# Patient Record
Sex: FEMALE | Race: WHITE | NOT HISPANIC OR LATINO | URBAN - METROPOLITAN AREA
[De-identification: names, ages, dates, MRNs, and addresses within clinical notes are randomized per-mention and may not be internally consistent; named-entity substitution may affect disease eponyms.]

---

## 2023-06-18 ENCOUNTER — OFFICE VISIT (OUTPATIENT)
Dept: URGENT CARE | Facility: CLINIC | Age: 68
End: 2023-06-18
Payer: MEDICARE

## 2023-06-18 VITALS
WEIGHT: 210 LBS | HEART RATE: 98 BPM | TEMPERATURE: 98 F | BODY MASS INDEX: 35.85 KG/M2 | HEIGHT: 64 IN | DIASTOLIC BLOOD PRESSURE: 82 MMHG | RESPIRATION RATE: 20 BRPM | SYSTOLIC BLOOD PRESSURE: 145 MMHG | OXYGEN SATURATION: 98 %

## 2023-06-18 DIAGNOSIS — M79.661 PAIN AND SWELLING OF RIGHT LOWER LEG: ICD-10-CM

## 2023-06-18 DIAGNOSIS — R58 ECCHYMOSIS: Primary | ICD-10-CM

## 2023-06-18 DIAGNOSIS — M79.89 PAIN AND SWELLING OF RIGHT LOWER LEG: ICD-10-CM

## 2023-06-18 LAB
APTT PPP: 27.6 SECONDS (ref 23.2–33.7)
BASOPHILS # BLD AUTO: 0.03 X10(3)/MCL
BASOPHILS NFR BLD AUTO: 0.3 %
EOSINOPHIL # BLD AUTO: 0.08 X10(3)/MCL (ref 0–0.9)
EOSINOPHIL NFR BLD AUTO: 0.9 %
ERYTHROCYTE [DISTWIDTH] IN BLOOD BY AUTOMATED COUNT: 13.6 % (ref 11.5–17)
HCT VFR BLD AUTO: 41.6 % (ref 37–47)
HGB BLD-MCNC: 13.6 G/DL (ref 12–16)
IMM GRANULOCYTES # BLD AUTO: 0.07 X10(3)/MCL (ref 0–0.04)
IMM GRANULOCYTES NFR BLD AUTO: 0.8 %
INR BLD: 0.99 (ref 0–1.3)
LYMPHOCYTES # BLD AUTO: 1.66 X10(3)/MCL (ref 0.6–4.6)
LYMPHOCYTES NFR BLD AUTO: 19.2 %
MCH RBC QN AUTO: 30.4 PG (ref 27–31)
MCHC RBC AUTO-ENTMCNC: 32.7 G/DL (ref 33–36)
MCV RBC AUTO: 92.9 FL (ref 80–94)
MONOCYTES # BLD AUTO: 0.69 X10(3)/MCL (ref 0.1–1.3)
MONOCYTES NFR BLD AUTO: 8 %
NEUTROPHILS # BLD AUTO: 6.12 X10(3)/MCL (ref 2.1–9.2)
NEUTROPHILS NFR BLD AUTO: 70.8 %
NRBC BLD AUTO-RTO: 0 %
PLATELET # BLD AUTO: 351 X10(3)/MCL (ref 130–400)
PMV BLD AUTO: 10.2 FL (ref 7.4–10.4)
PROTHROMBIN TIME: 13 SECONDS (ref 12.5–14.5)
RBC # BLD AUTO: 4.48 X10(6)/MCL (ref 4.2–5.4)
WBC # SPEC AUTO: 8.65 X10(3)/MCL (ref 4.5–11.5)

## 2023-06-18 PROCEDURE — 36415 COLL VENOUS BLD VENIPUNCTURE: CPT

## 2023-06-18 PROCEDURE — 85610 PROTHROMBIN TIME: CPT

## 2023-06-18 PROCEDURE — 99203 OFFICE O/P NEW LOW 30 MIN: CPT | Mod: ,,,

## 2023-06-18 PROCEDURE — 85730 THROMBOPLASTIN TIME PARTIAL: CPT

## 2023-06-18 PROCEDURE — 85025 COMPLETE CBC W/AUTO DIFF WBC: CPT

## 2023-06-18 PROCEDURE — 99203 PR OFFICE/OUTPT VISIT, NEW, LEVL III, 30-44 MIN: ICD-10-PCS | Mod: ,,,

## 2023-06-18 RX ORDER — MELOXICAM 7.5 MG/1
7.5 TABLET ORAL
COMMUNITY
Start: 2023-05-10

## 2023-06-18 RX ORDER — ALBUTEROL SULFATE 90 UG/1
2 AEROSOL, METERED RESPIRATORY (INHALATION) 4 TIMES DAILY PRN
COMMUNITY
Start: 2023-04-15

## 2023-06-18 RX ORDER — ESCITALOPRAM OXALATE 10 MG/1
10 TABLET ORAL
COMMUNITY
Start: 2023-04-12

## 2023-06-18 NOTE — PROGRESS NOTES
"Subjective:      Patient ID: Abby Walter is a 67 y.o. female.    Vitals:  height is 5' 4" (1.626 m) and weight is 95.3 kg (210 lb). Her oral temperature is 98.3 °F (36.8 °C). Her blood pressure is 145/82 (abnormal) and her pulse is 98. Her respiration is 20 and oxygen saturation is 98%.     Chief Complaint: Bleeding/Bruising (Right leg bruising, swelling, stiffness x 2 days.  Pt denies any pain. pt is currently in pt for arthritis and states that this may be the cause. Pt got a pedicure on 6/11/23 and hot rocks and intense massage were used. Pt denies any other known mechanism of injury. )    Patient is a 67-year-old female that presents complaining of right lower leg bruising, swelling, stiffness for the past 2 days but denies any pain to area.  States she is currently in physical therapy for arthritis and does several leg exercises.  Denies any trauma to area but does state that on 06/11 she got a pedicure with an intense massage.     She denies any pain, any warmth to leg, any trouble walking at this time.       Musculoskeletal:  Negative for pain and trauma.    Objective:     Physical Exam   Constitutional: She is oriented to person, place, and time.   HENT:   Head: Normocephalic.   Cardiovascular: Normal rate and normal pulses.   Pulmonary/Chest: Effort normal.   Abdominal: Normal appearance.   Musculoskeletal:      Right lower leg: She exhibits no tenderness and no deformity.      Comments: Ecchymosis discoloration to right lower leg from knee to ankle in front and back with very mild swelling.    Neurological: She is alert and oriented to person, place, and time.   Skin: Skin is warm. Capillary refill takes less than 2 seconds.   Psychiatric: Her behavior is normal. Mood, judgment and thought content normal.     Assessment:     1. Ecchymosis    2. Pain and swelling of right lower leg        Plan:     Patient is not in pain and does not want to go to ER to rule out a blood clot today.  Being it is Sunday " we are unable to put in order for ultrasound today.  She will call tomorrow and we will schedule appointment with [facility].  Order is placed already.     We will also do blood work Pt/PTT/ CBC.     Explained symptoms to patient that would warrant her going straight to emergency room.         Ecchymosis  -     APTT; Future; Expected date: 06/18/2023  -     Protime-INR; Future; Expected date: 06/18/2023  -     CBC Auto Differential; Future; Expected date: 06/18/2023    Pain and swelling of right lower leg  -     US Lower Extremity Veins Right; Future; Expected date: 06/18/2023  -     APTT; Future; Expected date: 06/18/2023  -     Protime-INR; Future; Expected date: 06/18/2023  -     CBC Auto Differential; Future; Expected date: 06/18/2023        This appears to be ecchymosis- which is where multiple blood vessels very close together birth resulting in pulled blood just under the skin surface.     However, we will run other test to rule out anything emergent such as a blood clot.     Call tomorrow for us to set you up with [facility] to get ultrasound of right lower leg.     We will call you with lab results when we get them in the next 1-2 days.     Go directly to emergency room if he began to have any severe pain, shortness that breath, chest pain or other worrisome symptoms.

## 2023-06-18 NOTE — PATIENT INSTRUCTIONS
This appears to be ecchymosis- which is where multiple blood vessels very close together burst resulting in pulled blood just under the skin surface.     However, we will run other test to rule out anything emergent such as a blood clot.     Call tomorrow for us to set you up with [facility] to get ultrasound of right lower leg.     We will call you with lab results when we get them in the next 1-2 days.     Go directly to emergency room if he began to have any severe pain, shortness that breath, chest pain or other worrisome symptoms.

## 2023-06-19 NOTE — PROGRESS NOTES
All blood work testing within normal ranges.  No abnormal bleeding time, no anemia no sign of infection.